# Patient Record
Sex: FEMALE | URBAN - METROPOLITAN AREA
[De-identification: names, ages, dates, MRNs, and addresses within clinical notes are randomized per-mention and may not be internally consistent; named-entity substitution may affect disease eponyms.]

---

## 2023-03-28 ENCOUNTER — POST-OPERATIVE VISIT (OUTPATIENT)
Facility: LOCATION | Age: 63
End: 2023-03-28
Payer: COMMERCIAL

## 2023-03-28 DIAGNOSIS — H16.223 KERATOCONJUNCT SICCA, NOT SPECIFIED AS SJOGREN'S, BILATERAL: ICD-10-CM

## 2023-03-28 DIAGNOSIS — D23.10 OTH BENIGN NEOPLASM SKIN/ UNSP EYELID, INCLUDING CANTHUS: ICD-10-CM

## 2023-03-28 DIAGNOSIS — Z96.1 PRESENCE OF INTRAOCULAR LENS: Primary | ICD-10-CM

## 2023-03-28 DIAGNOSIS — H04.123 DRY EYE SYNDROME OF BILATERAL LACRIMAL GLANDS: ICD-10-CM

## 2023-03-28 PROCEDURE — 99024 POSTOP FOLLOW-UP VISIT: CPT | Performed by: OPHTHALMOLOGY

## 2023-03-28 ASSESSMENT — INTRAOCULAR PRESSURE
OD: 11
OS: 11

## 2023-03-28 ASSESSMENT — VISUAL ACUITY: OD: 20/25

## 2023-03-28 NOTE — IMPRESSION/PLAN
Impression: Right lower lid Papilloma, growing. Plan: Recommended patient to remove pailloma in the future.

## 2023-03-28 NOTE — IMPRESSION/PLAN
Impression:  Presence of intraocular lens  Z96.1. S/P CEIOL OU Plan: D/C all post op drops. Patient is released to see Dr. Sammy La O.D.  for exam and new glasses. RTC PRN decrease VA, increase pain, redness, discharge, photophobia, flashes/floaters, or other vision problems.

## 2023-03-31 NOTE — IMPRESSION/PLAN
Impression: Dry eye syndrome of bilateral lacrimal glands: H04.123. Patient failed artificial tears. Plan: Start Refresh QID OU

RTC 6 weeks for extended plugs BUL and replace silicone plugs if not present. DFE OU.

## 2023-03-31 NOTE — IMPRESSION/PLAN
Impression: Keratoconjunct sicca, not specified as Sjogren's, bilateral: J96.483. Plan: See dry eye plan.

## 2023-05-09 ENCOUNTER — OFFICE VISIT (OUTPATIENT)
Facility: LOCATION | Age: 63
End: 2023-05-09
Payer: COMMERCIAL

## 2023-05-09 DIAGNOSIS — H16.223 KERATOCONJUNCT SICCA, NOT SPECIFIED AS SJOGREN'S, BILATERAL: ICD-10-CM

## 2023-05-09 DIAGNOSIS — H04.123 DRY EYE SYNDROME OF BILATERAL LACRIMAL GLANDS: Primary | ICD-10-CM

## 2023-05-09 DIAGNOSIS — H26.493 OTHER SECONDARY CATARACT, BILATERAL: ICD-10-CM

## 2023-05-09 NOTE — IMPRESSION/PLAN
Impression: Keratoconjunct sicca, not specified as Sjogren's, bilateral: H60.120.  Plan: see dry eye plan

## 2023-05-09 NOTE — IMPRESSION/PLAN
Impression: Dry eye syndrome of bilateral lacrimal glands: H04.123. Replaced extended plugs BUL and silicone RLL today, pt tolerated procedure well. 
Silicone plug in place LLL Plan: Increase Refresh Plus QID OU 
RTC 65 months for re eval with Dr. Ivanna Sheikh

## 2023-10-10 ENCOUNTER — OFFICE VISIT (OUTPATIENT)
Facility: LOCATION | Age: 63
End: 2023-10-10
Payer: COMMERCIAL

## 2023-10-10 DIAGNOSIS — H16.223 KERATOCONJUNCT SICCA, NOT SPECIFIED AS SJOGREN'S, BILATERAL: ICD-10-CM

## 2023-10-10 DIAGNOSIS — H04.123 DRY EYE SYNDROME OF BILATERAL LACRIMAL GLANDS: Primary | ICD-10-CM

## 2023-10-10 DIAGNOSIS — H26.493 OTHER SECONDARY CATARACT, BILATERAL: ICD-10-CM

## 2023-10-10 PROCEDURE — A4262 TEMPORARY TEAR DUCT PLUG: HCPCS | Performed by: OPHTHALMOLOGY

## 2023-10-10 ASSESSMENT — INTRAOCULAR PRESSURE
OD: 12
OS: 12

## 2023-10-24 ENCOUNTER — OFFICE VISIT (OUTPATIENT)
Facility: LOCATION | Age: 63
End: 2023-10-24
Payer: COMMERCIAL

## 2023-10-24 DIAGNOSIS — H26.492 OTHER SECONDARY CATARACT, LEFT EYE: ICD-10-CM

## 2023-10-24 DIAGNOSIS — H26.491 OTHER SECONDARY CATARACT, RIGHT EYE: Primary | ICD-10-CM

## 2023-10-24 PROCEDURE — 66821 AFTER CATARACT LASER SURGERY: CPT | Performed by: OPHTHALMOLOGY

## 2023-10-24 ASSESSMENT — INTRAOCULAR PRESSURE
OS: 14
OD: 12

## 2023-11-07 ENCOUNTER — OFFICE VISIT (OUTPATIENT)
Facility: LOCATION | Age: 63
End: 2023-11-07
Payer: COMMERCIAL

## 2023-11-07 DIAGNOSIS — H26.492 OTHER SECONDARY CATARACT, LEFT EYE: Primary | ICD-10-CM

## 2023-11-07 DIAGNOSIS — H04.123 DRY EYE SYNDROME OF BILATERAL LACRIMAL GLANDS: ICD-10-CM

## 2023-11-07 DIAGNOSIS — H26.491 OTHER SECONDARY CATARACT, RIGHT EYE: ICD-10-CM

## 2023-11-07 PROCEDURE — 66821 AFTER CATARACT LASER SURGERY: CPT | Performed by: OPHTHALMOLOGY

## 2023-11-07 ASSESSMENT — VISUAL ACUITY: OD: 20/30

## 2024-03-05 ENCOUNTER — OFFICE VISIT (OUTPATIENT)
Facility: LOCATION | Age: 64
End: 2024-03-05
Payer: COMMERCIAL

## 2024-03-05 DIAGNOSIS — H04.123 DRY EYE SYNDROME OF BILATERAL LACRIMAL GLANDS: Primary | ICD-10-CM

## 2024-03-05 DIAGNOSIS — E11.9 TYPE 2 DIABETES MELLITUS WITHOUT COMPLICATIONS: ICD-10-CM

## 2024-03-05 DIAGNOSIS — H16.223 KERATOCONJUNCT SICCA, NOT SPECIFIED AS SJOGREN'S, BILATERAL: ICD-10-CM

## 2024-03-05 ASSESSMENT — INTRAOCULAR PRESSURE
OS: 12
OD: 12

## 2024-06-25 ENCOUNTER — OFFICE VISIT (OUTPATIENT)
Facility: LOCATION | Age: 64
End: 2024-06-25
Payer: COMMERCIAL

## 2024-06-25 DIAGNOSIS — H04.123 DRY EYE SYNDROME OF BILATERAL LACRIMAL GLANDS: Primary | ICD-10-CM

## 2024-06-25 DIAGNOSIS — H16.223 KERATOCONJUNCT SICCA, NOT SPECIFIED AS SJOGREN'S, BILATERAL: ICD-10-CM

## 2024-06-25 DIAGNOSIS — E11.9 TYPE 2 DIABETES MELLITUS WITHOUT COMPLICATIONS: ICD-10-CM

## 2024-06-25 PROCEDURE — 99214 OFFICE O/P EST MOD 30 MIN: CPT | Performed by: OPHTHALMOLOGY

## 2024-06-25 ASSESSMENT — INTRAOCULAR PRESSURE
OD: 13
OS: 13